# Patient Record
Sex: FEMALE | Race: ASIAN | URBAN - METROPOLITAN AREA
[De-identification: names, ages, dates, MRNs, and addresses within clinical notes are randomized per-mention and may not be internally consistent; named-entity substitution may affect disease eponyms.]

---

## 2018-09-17 ENCOUNTER — OFFICE VISIT (OUTPATIENT)
Dept: FAMILY MEDICINE CLINIC | Facility: CLINIC | Age: 28
End: 2018-09-17
Payer: COMMERCIAL

## 2018-09-17 VITALS
OXYGEN SATURATION: 98 % | RESPIRATION RATE: 18 BRPM | SYSTOLIC BLOOD PRESSURE: 102 MMHG | BODY MASS INDEX: 27.02 KG/M2 | DIASTOLIC BLOOD PRESSURE: 64 MMHG | HEIGHT: 61 IN | TEMPERATURE: 98 F | WEIGHT: 143.13 LBS | HEART RATE: 100 BPM

## 2018-09-17 DIAGNOSIS — E04.2 MULTIPLE THYROID NODULES: ICD-10-CM

## 2018-09-17 DIAGNOSIS — N92.6 MISSED PERIOD: Primary | ICD-10-CM

## 2018-09-17 DIAGNOSIS — E03.9 ACQUIRED HYPOTHYROIDISM: ICD-10-CM

## 2018-09-17 DIAGNOSIS — Z23 NEED FOR VACCINATION: ICD-10-CM

## 2018-09-17 DIAGNOSIS — Z3A.01 LESS THAN 8 WEEKS GESTATION OF PREGNANCY: ICD-10-CM

## 2018-09-17 LAB
CONTROL LINE PRESENT WITH A CLEAR BACKGROUND (YES/NO): YES YES/NO
PREGNANCY TEST, URINE: POSITIVE
T4 FREE SERPL-MCNC: 1.1 NG/DL (ref 0.9–1.8)
TSI SER-ACNC: 3.84 MIU/ML (ref 0.35–5.5)

## 2018-09-17 PROCEDURE — 36415 COLL VENOUS BLD VENIPUNCTURE: CPT | Performed by: FAMILY MEDICINE

## 2018-09-17 PROCEDURE — 99203 OFFICE O/P NEW LOW 30 MIN: CPT | Performed by: FAMILY MEDICINE

## 2018-09-17 PROCEDURE — 84443 ASSAY THYROID STIM HORMONE: CPT | Performed by: FAMILY MEDICINE

## 2018-09-17 PROCEDURE — 84439 ASSAY OF FREE THYROXINE: CPT | Performed by: FAMILY MEDICINE

## 2018-09-17 PROCEDURE — 81025 URINE PREGNANCY TEST: CPT | Performed by: FAMILY MEDICINE

## 2018-09-17 RX ORDER — LEVOTHYROXINE SODIUM 0.05 MG/1
1 TABLET ORAL DAILY
Refills: 0 | COMMUNITY
Start: 2018-07-20 | End: 2018-09-21

## 2018-09-17 NOTE — PROGRESS NOTES
Patient came in forlabs. Patient first draw attempt at right [facility] unsuccessful. Second attempt at right hand. Green tube drawn.

## 2018-09-17 NOTE — PROGRESS NOTES
CHIEF COMPLAINT: Patient presents with:  Establish Care: thyroid meds, missed period    HPI:     Kim Meza is a 29year old female presents for establish care and discuss thyroid.   Diagnosed by former PCP with hypothyroidism and started on levothyroxine the HPI.     PHYSICAL EXAM:   /64 (BP Location: Left arm, Patient Position: Sitting, Cuff Size: adult)   Pulse 100   Temp 98.3 °F (36.8 °C) (Oral)   Resp 18   Ht 61\"   Wt 143 lb 1.6 oz   LMP 08/04/2018 (Exact Date)   SpO2 98%   BMI 27.04 kg/m²   Tisha Do not exceed 11 pills daily of regular strength tylenol (acetaminophen) and or vicodin or you can cause permanent liver damage or liver failure. · May use plain Mucinex or guaifenesin and nasal saline if nasal congestion or cold Flonase.   · Avoid tuna du or worsening or changing symptoms. Patient is to call with any side effects or complications from the treatments as a result of today. Problem List:   There is no problem list on file for this patient.       Imaging & Referrals:  None     9/17/2018  David Hogan

## 2018-09-18 NOTE — TELEPHONE ENCOUNTER
LMOM to return call to the office. Provided pt office phone [phone] along with office hours given.    ----- Message from iGovanny Valdez DO sent at 9/18/2018  9:09 AM CDT -----  Results reviewed. Tests show no significant abnormalities.  Repeat in 3 m

## 2018-09-21 RX ORDER — LEVOTHYROXINE SODIUM 0.05 MG/1
50 TABLET ORAL DAILY
Qty: 90 TABLET | Refills: 0 | Status: SHIPPED | OUTPATIENT
Start: 2018-09-21

## 2018-09-21 NOTE — TELEPHONE ENCOUNTER
Spoke with pt, reviewed results and recommendations. Pt verbalized understanding    Pt requesting refill on Levothyroxine, protocol passed, refill approved    No future appointments.

## 2019-03-07 ENCOUNTER — PATIENT OUTREACH (OUTPATIENT)
Dept: FAMILY MEDICINE CLINIC | Facility: CLINIC | Age: 29
End: 2019-03-07

## (undated) NOTE — LETTER
March 7, 2019        Jody Conley  [address]      Dear Linda Oconnor: We have attempted to contact you by phone with no success.  In an effort to provide quality patient care we are reaching out to you to contact the office at your earlies